# Patient Record
Sex: MALE | Race: WHITE | NOT HISPANIC OR LATINO | Employment: FULL TIME | URBAN - NONMETROPOLITAN AREA
[De-identification: names, ages, dates, MRNs, and addresses within clinical notes are randomized per-mention and may not be internally consistent; named-entity substitution may affect disease eponyms.]

---

## 2024-02-21 ENCOUNTER — OFFICE VISIT (OUTPATIENT)
Dept: PEDIATRICS | Facility: CLINIC | Age: 19
End: 2024-02-21
Payer: COMMERCIAL

## 2024-02-21 VITALS
TEMPERATURE: 98.6 F | HEART RATE: 87 BPM | HEIGHT: 72 IN | BODY MASS INDEX: 31.15 KG/M2 | WEIGHT: 230 LBS | OXYGEN SATURATION: 98 %

## 2024-02-21 DIAGNOSIS — J06.9 VIRAL UPPER RESPIRATORY TRACT INFECTION: ICD-10-CM

## 2024-02-21 DIAGNOSIS — J45.21 MILD INTERMITTENT ASTHMA WITH EXACERBATION (HHS-HCC): Primary | ICD-10-CM

## 2024-02-21 PROBLEM — Z78.9 MEDICAL HOME PATIENT: Status: ACTIVE | Noted: 2024-02-21

## 2024-02-21 PROBLEM — S46.919A STRAIN OF SHOULDER: Status: ACTIVE | Noted: 2024-02-21

## 2024-02-21 PROBLEM — S46.011A STRAIN OF RIGHT ROTATOR CUFF CAPSULE: Status: ACTIVE | Noted: 2024-02-21

## 2024-02-21 PROBLEM — S43.51XA SPRAIN OF RIGHT ACROMIOCLAVICULAR JOINT: Status: ACTIVE | Noted: 2024-02-21

## 2024-02-21 PROBLEM — J45.40: Status: ACTIVE | Noted: 2017-09-07

## 2024-02-21 PROBLEM — K21.9 GASTROESOPHAGEAL REFLUX DISEASE WITHOUT ESOPHAGITIS: Status: ACTIVE | Noted: 2017-09-07

## 2024-02-21 PROBLEM — J45.901 EXACERBATION OF ASTHMA (HHS-HCC): Status: ACTIVE | Noted: 2024-02-21

## 2024-02-21 PROBLEM — M75.51 SUBACROMIAL BURSITIS OF RIGHT SHOULDER JOINT: Status: ACTIVE | Noted: 2024-02-21

## 2024-02-21 PROBLEM — M77.8 CAPSULITIS OF RIGHT SHOULDER: Status: ACTIVE | Noted: 2024-02-21

## 2024-02-21 PROCEDURE — 99214 OFFICE O/P EST MOD 30 MIN: CPT | Performed by: PEDIATRICS

## 2024-02-21 PROCEDURE — 1036F TOBACCO NON-USER: CPT | Performed by: PEDIATRICS

## 2024-02-21 PROCEDURE — 94760 N-INVAS EAR/PLS OXIMETRY 1: CPT | Performed by: PEDIATRICS

## 2024-02-21 RX ORDER — ALBUTEROL SULFATE 90 UG/1
1-2 AEROSOL, METERED RESPIRATORY (INHALATION)
COMMUNITY
Start: 2023-08-10 | End: 2024-03-18

## 2024-02-21 RX ORDER — PREDNISONE 20 MG/1
60 TABLET ORAL DAILY
Qty: 15 TABLET | Refills: 0 | Status: SHIPPED | OUTPATIENT
Start: 2024-02-21 | End: 2024-02-26

## 2024-02-21 RX ORDER — ALBUTEROL SULFATE 0.83 MG/ML
2.5 SOLUTION RESPIRATORY (INHALATION) EVERY 4 HOURS PRN
COMMUNITY
Start: 2019-05-23

## 2024-02-21 ASSESSMENT — PAIN SCALES - GENERAL: PAINLEVEL: 0-NO PAIN

## 2024-02-21 ASSESSMENT — PATIENT HEALTH QUESTIONNAIRE - PHQ9
1. LITTLE INTEREST OR PLEASURE IN DOING THINGS: NOT AT ALL
2. FEELING DOWN, DEPRESSED OR HOPELESS: NOT AT ALL
SUM OF ALL RESPONSES TO PHQ9 QUESTIONS 1 AND 2: 0

## 2024-02-21 ASSESSMENT — LIFESTYLE VARIABLES: HOW OFTEN DO YOU HAVE A DRINK CONTAINING ALCOHOL: NEVER

## 2024-02-21 NOTE — PROGRESS NOTES
"Subjective   History was provided by the patient.  Napoleon Zhang is a 19 y.o. male who presents for evaluation of symptoms of a URI. Symptoms include nasal blockage, post nasal drip, sinus and nasal congestion, and wheezing. Onset of symptoms was 1 week ago, unchanged since that time. Associated symptoms include nasal congestion. He is drinking plenty of fluids. Evaluation to date: seen previously and thought to have a viral URI. Treatment to date: none and albuterol    Allergies   Allergen Reactions    Cefdinir GI Upset, Hives and Nausea/vomiting    Penicillins Hives      Objective   Pulse 87   Temp 37 °C (98.6 °F) (Temporal)   Ht 1.835 m (6' 0.25\")   Wt 104 kg (230 lb)   SpO2 98%   BMI 30.98 kg/m²   General: alert, active, in no acute distress  Eyes: conjunctiva clear  Ears: tympanic membranes clear bilaterally  Nose: clear congestion  Throat: clear  Neck: supple, no lymphadenopathy  Lungs: end exp wheezing, no crackles or rhonchi, breathing unlabored  Heart: regular rate and rhythm, normal S1, S2, no murmurs or gallops.  Abdomen: Abdomen soft, non-tender.  BS normal. , no organomegaly  Skin: no rashes        Assessment/Plan     1. Mild intermittent asthma with exacerbation  Continue albuterol  2 puffs q 4   - predniSONE (Deltasone) 20 mg tablet; Take 3 tablets (60 mg) by mouth once daily for 5 days.  Dispense: 15 tablet; Refill: 0    2. Viral upper respiratory tract infection       Follow up as needed.  "

## 2024-05-15 ENCOUNTER — OFFICE VISIT (OUTPATIENT)
Dept: PRIMARY CARE | Facility: CLINIC | Age: 19
End: 2024-05-15
Payer: COMMERCIAL

## 2024-05-15 VITALS
BODY MASS INDEX: 30.27 KG/M2 | WEIGHT: 228.4 LBS | HEIGHT: 73 IN | OXYGEN SATURATION: 99 % | SYSTOLIC BLOOD PRESSURE: 128 MMHG | DIASTOLIC BLOOD PRESSURE: 84 MMHG | HEART RATE: 68 BPM

## 2024-05-15 DIAGNOSIS — J45.40 ASTHMA, WELL CONTROLLED, MODERATE PERSISTENT (HHS-HCC): Primary | ICD-10-CM

## 2024-05-15 PROBLEM — S46.919A STRAIN OF SHOULDER: Status: RESOLVED | Noted: 2024-02-21 | Resolved: 2024-05-15

## 2024-05-15 PROBLEM — M77.8 CAPSULITIS OF RIGHT SHOULDER: Status: RESOLVED | Noted: 2024-02-21 | Resolved: 2024-05-15

## 2024-05-15 PROBLEM — S43.51XA SPRAIN OF RIGHT ACROMIOCLAVICULAR JOINT: Status: RESOLVED | Noted: 2024-02-21 | Resolved: 2024-05-15

## 2024-05-15 PROBLEM — M75.51 SUBACROMIAL BURSITIS OF RIGHT SHOULDER JOINT: Status: RESOLVED | Noted: 2024-02-21 | Resolved: 2024-05-15

## 2024-05-15 PROBLEM — S46.011A STRAIN OF RIGHT ROTATOR CUFF CAPSULE: Status: RESOLVED | Noted: 2024-02-21 | Resolved: 2024-05-15

## 2024-05-15 PROBLEM — Z78.9 MEDICAL HOME PATIENT: Status: RESOLVED | Noted: 2024-02-21 | Resolved: 2024-05-15

## 2024-05-15 PROBLEM — J45.901 EXACERBATION OF ASTHMA (HHS-HCC): Status: RESOLVED | Noted: 2024-02-21 | Resolved: 2024-05-15

## 2024-05-15 PROBLEM — K21.9 GASTROESOPHAGEAL REFLUX DISEASE WITHOUT ESOPHAGITIS: Status: RESOLVED | Noted: 2017-09-07 | Resolved: 2024-05-15

## 2024-05-15 PROCEDURE — 99203 OFFICE O/P NEW LOW 30 MIN: CPT | Performed by: FAMILY MEDICINE

## 2024-05-15 PROCEDURE — 1036F TOBACCO NON-USER: CPT | Performed by: FAMILY MEDICINE

## 2024-05-15 PROCEDURE — 90715 TDAP VACCINE 7 YRS/> IM: CPT | Performed by: FAMILY MEDICINE

## 2024-05-15 ASSESSMENT — ENCOUNTER SYMPTOMS
DIARRHEA: 0
PALPITATIONS: 0
CONSTIPATION: 0
COUGH: 0
HEADACHES: 0
ACTIVITY CHANGE: 0
CHEST TIGHTNESS: 0
DIZZINESS: 0
SHORTNESS OF BREATH: 0

## 2024-05-15 ASSESSMENT — PATIENT HEALTH QUESTIONNAIRE - PHQ9
2. FEELING DOWN, DEPRESSED OR HOPELESS: NOT AT ALL
SUM OF ALL RESPONSES TO PHQ9 QUESTIONS 1 AND 2: 0
1. LITTLE INTEREST OR PLEASURE IN DOING THINGS: NOT AT ALL

## 2024-05-15 ASSESSMENT — PAIN SCALES - GENERAL: PAINLEVEL: 0-NO PAIN

## 2024-05-15 NOTE — PROGRESS NOTES
"Subjective   Patient ID: Napoleon Zhang is a 19 y.o. male who presents for Establish PCP.    HPI   He presents today to establish as a patient.  Overall doing well.  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    Does not use his albuterol inhaler much at all now.  He states his pulmonologist thinks that maybe he is going out of the asthma.    Review of Systems   Constitutional:  Negative for activity change.   HENT:  Negative for congestion.    Respiratory:  Negative for cough, chest tightness and shortness of breath.    Cardiovascular:  Negative for chest pain, palpitations and leg swelling.   Gastrointestinal:  Negative for constipation and diarrhea.   Neurological:  Negative for dizziness and headaches.       Objective   /84   Pulse 68   Ht 1.861 m (6' 1.25\")   Wt 104 kg (228 lb 6.4 oz)   SpO2 99%   BMI 29.93 kg/m²     Physical Exam  Vitals reviewed.   Constitutional:       Appearance: Normal appearance.   HENT:      Right Ear: Tympanic membrane, ear canal and external ear normal.      Left Ear: Tympanic membrane, ear canal and external ear normal.      Nose: Nose normal.      Mouth/Throat:      Mouth: Mucous membranes are moist.      Pharynx: Oropharynx is clear.   Eyes:      Conjunctiva/sclera: Conjunctivae normal.   Neck:      Thyroid: No thyromegaly or thyroid tenderness.      Vascular: No carotid bruit.   Cardiovascular:      Rate and Rhythm: Normal rate and regular rhythm.      Heart sounds: No murmur heard.  Pulmonary:      Effort: Pulmonary effort is normal.      Breath sounds: Normal breath sounds.   Abdominal:      General: Abdomen is flat.      Palpations: Abdomen is soft. There is no mass.      Tenderness: There is no abdominal tenderness.   Musculoskeletal:      Cervical back: Neck supple.      Right lower leg: No edema.      Left lower leg: No edema.   Lymphadenopathy:      Cervical: No " cervical adenopathy.   Skin:     General: Skin is warm and dry.   Neurological:      Mental Status: He is alert.   Psychiatric:         Mood and Affect: Mood normal.         Assessment/Plan   Diagnoses and all orders for this visit:  Asthma, well controlled, moderate persistent ([facility])  Overall, doing very well.  He will keep the inhaler around just in case he develops a cough.  We discussed that sometimes a cold or any respiratory infection can trigger the asthma so just use the inhaler at that time.  Follow-up with me as needed.

## 2024-10-02 ENCOUNTER — OFFICE VISIT (OUTPATIENT)
Dept: PRIMARY CARE | Facility: CLINIC | Age: 19
End: 2024-10-02
Payer: COMMERCIAL

## 2024-10-02 VITALS
OXYGEN SATURATION: 98 % | SYSTOLIC BLOOD PRESSURE: 132 MMHG | TEMPERATURE: 99.5 F | BODY MASS INDEX: 32.08 KG/M2 | HEART RATE: 102 BPM | DIASTOLIC BLOOD PRESSURE: 84 MMHG | WEIGHT: 244.8 LBS

## 2024-10-02 DIAGNOSIS — J06.9 VIRAL URI: Primary | ICD-10-CM

## 2024-10-02 PROCEDURE — 99213 OFFICE O/P EST LOW 20 MIN: CPT | Performed by: FAMILY MEDICINE

## 2024-10-02 PROCEDURE — 1036F TOBACCO NON-USER: CPT | Performed by: FAMILY MEDICINE

## 2024-10-02 ASSESSMENT — PATIENT HEALTH QUESTIONNAIRE - PHQ9
2. FEELING DOWN, DEPRESSED OR HOPELESS: NOT AT ALL
1. LITTLE INTEREST OR PLEASURE IN DOING THINGS: NOT AT ALL
SUM OF ALL RESPONSES TO PHQ9 QUESTIONS 1 AND 2: 0

## 2024-10-02 ASSESSMENT — PAIN SCALES - GENERAL: PAINLEVEL: 0-NO PAIN

## 2024-10-02 NOTE — LETTER
October 2, 2024     Patient: Napoleon Zhang   YOB: 2005   Date of Visit: 10/2/2024       To Whom It May Concern:    Napoleon Zhang was seen in my clinic on 10/2/2024 at 12:45 pm. Please excuse Napoleon for his absence from work this week. He will return to work on Friday if feeling back to normal.    If you have any questions or concerns, please don't hesitate to call.         Sincerely,         Roger Aguilera MD        CC: No Recipients

## 2024-10-02 NOTE — LETTER
October 4, 2024     Patient: Napoleon Zhang   YOB: 2005   Date of Visit: 10/2/2024       To Whom It May Concern:    Napoleon Zhang was seen in my clinic on 10/2/2024 at 12:45 pm. Please excuse Napoleon for his absence from work this week. He will return to work on Monday 10/7/24,  if feeling back to normal.     If you have any questions or concerns, please don't hesitate to call.    Sincerely,         Roger Aguilera MD

## 2024-10-02 NOTE — PROGRESS NOTES
Subjective   Patient ID: Napoleon Zhang is a 19 y.o. male who presents for Fever (X 3 days), Cough (X 1 week), Nasal Congestion (Today), and Headache (today).    HPI   He presents today feeling ill since Sunday, 3 days ago.  He has had a cough for about a week but then a fever over these last 3 days and nasal congestion beginning more today.  He states he is very sensitive in his skin.  Feels fatigued.  Has been sleeping quite a bit.  He is missed work these last week because of the symptoms.  His dad was sick with similar symptoms last week.    States he had a sensitivity to a vanilla perfume.    Review of Systems    Objective   /84   Pulse 102   Temp 37.5 °C (99.5 °F) (Oral)   Wt 111 kg (244 lb 12.8 oz)   SpO2 98%   BMI 32.08 kg/m²     Physical Exam  Vitals reviewed.   Constitutional:       Appearance: He is not toxic-appearing.   HENT:      Right Ear: Tympanic membrane and ear canal normal.      Left Ear: Tympanic membrane and ear canal normal.      Nose: Congestion and rhinorrhea present.      Mouth/Throat:      Mouth: Mucous membranes are moist.      Pharynx: Oropharynx is clear.   Eyes:      Conjunctiva/sclera: Conjunctivae normal.   Pulmonary:      Effort: Pulmonary effort is normal. No respiratory distress.      Breath sounds: No wheezing or rhonchi.   Musculoskeletal:      Cervical back: No rigidity.   Lymphadenopathy:      Cervical: No cervical adenopathy.   Neurological:      Mental Status: He is alert.   Psychiatric:         Mood and Affect: Mood normal.         Assessment/Plan   Diagnoses and all orders for this visit:  Viral URI  At this point, he will rest at home, drink plenty fluids, keep the stomach warm and let me know how he does over the next 2 or 3 days.  Be getting any worse or not really recovered much by Friday he will let me know.  Little unclear exactly how long he has been sick.  Certainly quite sick for the last 3 days but may have had some other symptoms such as cough prior  to that.,  So might consider an antibiotic if he is not really improving much over the next couple days.

## 2024-10-04 ENCOUNTER — TELEPHONE (OUTPATIENT)
Dept: PRIMARY CARE | Facility: CLINIC | Age: 19
End: 2024-10-04

## 2024-10-04 ENCOUNTER — APPOINTMENT (OUTPATIENT)
Dept: RADIOLOGY | Facility: HOSPITAL | Age: 19
End: 2024-10-04
Payer: COMMERCIAL

## 2024-10-04 ENCOUNTER — HOSPITAL ENCOUNTER (EMERGENCY)
Facility: HOSPITAL | Age: 19
Discharge: HOME | End: 2024-10-04
Payer: COMMERCIAL

## 2024-10-04 VITALS
HEIGHT: 74 IN | OXYGEN SATURATION: 100 % | RESPIRATION RATE: 18 BRPM | SYSTOLIC BLOOD PRESSURE: 153 MMHG | BODY MASS INDEX: 30.95 KG/M2 | WEIGHT: 241.18 LBS | TEMPERATURE: 98.4 F | HEART RATE: 98 BPM | DIASTOLIC BLOOD PRESSURE: 93 MMHG

## 2024-10-04 DIAGNOSIS — B34.9 ACUTE VIRAL SYNDROME: Primary | ICD-10-CM

## 2024-10-04 LAB
FLUAV RNA RESP QL NAA+PROBE: NOT DETECTED
FLUBV RNA RESP QL NAA+PROBE: NOT DETECTED
S PYO DNA THROAT QL NAA+PROBE: NOT DETECTED
SARS-COV-2 RNA RESP QL NAA+PROBE: NOT DETECTED

## 2024-10-04 PROCEDURE — 99283 EMERGENCY DEPT VISIT LOW MDM: CPT

## 2024-10-04 PROCEDURE — 87651 STREP A DNA AMP PROBE: CPT

## 2024-10-04 PROCEDURE — 71045 X-RAY EXAM CHEST 1 VIEW: CPT | Performed by: RADIOLOGY

## 2024-10-04 PROCEDURE — 71046 X-RAY EXAM CHEST 2 VIEWS: CPT

## 2024-10-04 PROCEDURE — 87635 SARS-COV-2 COVID-19 AMP PRB: CPT

## 2024-10-04 PROCEDURE — 87636 SARSCOV2 & INF A&B AMP PRB: CPT

## 2024-10-04 ASSESSMENT — COLUMBIA-SUICIDE SEVERITY RATING SCALE - C-SSRS
2. HAVE YOU ACTUALLY HAD ANY THOUGHTS OF KILLING YOURSELF?: NO
6. HAVE YOU EVER DONE ANYTHING, STARTED TO DO ANYTHING, OR PREPARED TO DO ANYTHING TO END YOUR LIFE?: NO
1. IN THE PAST MONTH, HAVE YOU WISHED YOU WERE DEAD OR WISHED YOU COULD GO TO SLEEP AND NOT WAKE UP?: NO

## 2024-10-04 ASSESSMENT — PAIN - FUNCTIONAL ASSESSMENT: PAIN_FUNCTIONAL_ASSESSMENT: 0-10

## 2024-10-04 ASSESSMENT — PAIN SCALES - GENERAL: PAINLEVEL_OUTOF10: 1

## 2024-10-04 ASSESSMENT — PAIN DESCRIPTION - LOCATION: LOCATION: GENERALIZED

## 2024-10-04 NOTE — ED PROVIDER NOTES
HPI   Chief Complaint   Patient presents with    Fever     Fever, chills, body aches, cough for 4-5 days. Nausea vomiting for two days without diarrhea. Took ibuprofen at 1330       Patient is a 19-year-old male presents emergency department accompanied by mother for evaluation of fevers, chills, body aches, and cough for 4 to 5 days.  Patient states that multiple individuals in his house have been sick with similar symptoms.  He states he started with a somewhat sore throat and has turned into nasal congestion, cough and congestion.  He states he has widespread body aches.  He has been having fevers at home as well.  He did have 1 episode of vomiting and some mild nausea.  He denies any associate abdominal pain.  He denies any difficulty breathing, chest pain, lightheadedness, dizziness, or other concerns at this time.  He denies any difficulty swallowing.  He has been urinating and defecating without difficulty.      History provided by:  Patient   used: No            Patient History   No past medical history on file.  No past surgical history on file.  No family history on file.  Social History     Tobacco Use    Smoking status: Never    Smokeless tobacco: Never   Vaping Use    Vaping status: Never Used   Substance Use Topics    Alcohol use: Never    Drug use: Never       Physical Exam   ED Triage Vitals [10/04/24 1525]   Temperature Heart Rate Respirations BP   36.9 °C (98.4 °F) 98 18 (!) 153/93      Pulse Ox Temp src Heart Rate Source Patient Position   100 % -- Monitor --      BP Location FiO2 (%)     -- --       Physical Exam  Constitutional:       Appearance: Normal appearance.   HENT:      Mouth/Throat:      Mouth: Mucous membranes are moist.      Pharynx: No oropharyngeal exudate or posterior oropharyngeal erythema.   Eyes:      Extraocular Movements: Extraocular movements intact.      Pupils: Pupils are equal, round, and reactive to light.   Cardiovascular:      Rate and Rhythm: Normal  rate and regular rhythm.   Pulmonary:      Effort: Pulmonary effort is normal.      Breath sounds: Normal breath sounds.   Musculoskeletal:         General: Normal range of motion.   Skin:     General: Skin is warm and dry.   Neurological:      General: No focal deficit present.      Mental Status: He is alert and oriented to person, place, and time.           ED Course & MDM   Diagnoses as of 10/04/24 6787   Acute viral syndrome                 No data recorded     Dolph Coma Scale Score: 15 (10/04/24 1523 : Ashley Smith RN)                           Medical Decision Making  Patient is a 19-year-old male presents emergency department for evaluation of leg symptoms over the last 4 to 5 days.    Lab work done today included strep test and flu/COVID swabs.  Strep test negative and flu/COVID swabs negative.      Scans done today were interpreted/confirmed by radiologist and also interpreted by me which included chest x-ray.  Chest x-ray shows no acute focal consolidation.    Medications Not given at today's visit    I saw this patient independently.  Patient remained stable in the emergency department and well-appearing.  He is afebrile and nontoxic-appearing.  He is oxygenating well on room air.  Chest x-ray shows no evidence for acute cardiopulmonary process.  Patient has clinical symptoms, history, and physical exam consistent with viral syndrome.  Given length of symptoms and relatively benign exam with stable vital signs, patient is able to be discharged home for supportive care outpatient.  There appears to be no evidence of any secondary bacterial infections including otitis media, pneumonia, exudative pharyngitis, CNS infections, or concern for widespread systemic infection such as sepsis.  Patient was educated about measures to take for symptom management including ibuprofen, Tylenol, and over-the-counter cold medications to help with symptoms.  Patient was advised to increase fluids to prevent any  dehydration.  Emergent pathologies were considered for this patient, although I have low suspicion for anything acutely emergent given patient's clinical presentation, history, physical exam, stable vital signs, and relatively unremarkable workup.  Discharging patient home is reasonable plan of care for outpatient management.    All labs, imaging, and diagnostic studies were reviewed by me and patient was counseled on clinical impression, expectations, and plan.  Patient was educated to follow-up with PCP in the following 1-2 days.  All questions from patient were answered. They elicited understanding and were agreeable to course of treatment.  Patient was discharged in stable condition and given strict return precautions.    ** Disclaimer:  Parts of this document were written utilizing a voice to text dictation software.  Note may contain minor transcription or typographical errors that were inadvertently transcribed by the computer software.        Procedure  Procedures     Ashley Quan PA-C  10/04/24 9803

## 2024-10-04 NOTE — Clinical Note
Napoleon Zhang was seen and treated in our emergency department on 10/4/2024.  He may return to work on 10/06/2024.       If you have any questions or concerns, please don't hesitate to call.      Ashley Quan PA-C

## 2024-10-04 NOTE — DISCHARGE INSTRUCTIONS
Follow close with your primary care provider in the following week.  Continue over-the-counter cold medications and continue Tylenol and ibuprofen.  Increase fluids to prevent any dehydration.

## 2025-01-13 ENCOUNTER — OFFICE VISIT (OUTPATIENT)
Dept: URGENT CARE | Age: 20
End: 2025-01-13

## 2025-01-13 VITALS
TEMPERATURE: 98.2 F | HEIGHT: 73 IN | WEIGHT: 240.08 LBS | BODY MASS INDEX: 31.82 KG/M2 | RESPIRATION RATE: 16 BRPM | OXYGEN SATURATION: 99 % | DIASTOLIC BLOOD PRESSURE: 75 MMHG | HEART RATE: 68 BPM | SYSTOLIC BLOOD PRESSURE: 128 MMHG

## 2025-01-13 DIAGNOSIS — Z00.00 PHYSICAL EXAM: Primary | ICD-10-CM

## 2025-01-13 PROCEDURE — 3008F BODY MASS INDEX DOCD: CPT

## 2025-01-13 PROCEDURE — 1036F TOBACCO NON-USER: CPT

## 2025-01-13 PROCEDURE — INPHY INTER PHYSICAL

## 2025-01-13 ASSESSMENT — VISUAL ACUITY
OD_CC: 20/15
OS_CC: 20/25

## 2025-01-13 NOTE — PROGRESS NOTES
"Subjective   Patient ID: Napoleon Zhang is a 19 y.o. male. They present today with a chief complaint of Physical.    History of Present Illness  See mdm       History provided by:  Patient   used: No        Past Medical History  Allergies as of 01/13/2025 - Reviewed 01/13/2025   Allergen Reaction Noted    Cefdinir GI Upset, Hives, and Nausea/vomiting 04/24/2019    Penicillins Hives 04/23/2010       (Not in a hospital admission)       No past medical history on file.    No past surgical history on file.     reports that he has never smoked. He has never used smokeless tobacco. He reports that he does not drink alcohol and does not use drugs.    Review of Systems  Review of Systems   All other systems reviewed and are negative.                                 Objective    Vitals:    01/13/25 1824   BP: 128/75   Pulse: 68   Resp: 16   Temp: 36.8 °C (98.2 °F)   SpO2: 99%   Weight: 109 kg (240 lb 1.3 oz)   Height: 1.854 m (6' 1\")     No LMP for male patient.    Physical Exam  Vitals and nursing note reviewed.   Constitutional:       General: He is not in acute distress.     Appearance: He is normal weight. He is not ill-appearing, toxic-appearing or diaphoretic.   HENT:      Head: Normocephalic and atraumatic.      Nose: Nose normal.      Mouth/Throat:      Mouth: Mucous membranes are moist.      Pharynx: No oropharyngeal exudate or posterior oropharyngeal erythema.   Eyes:      General: No scleral icterus.        Right eye: No discharge.         Left eye: No discharge.      Extraocular Movements: Extraocular movements intact.      Conjunctiva/sclera: Conjunctivae normal.      Pupils: Pupils are equal, round, and reactive to light.   Cardiovascular:      Rate and Rhythm: Normal rate and regular rhythm.      Pulses: Normal pulses.      Heart sounds: No murmur heard.     No friction rub. No gallop.   Pulmonary:      Effort: Pulmonary effort is normal. No respiratory distress.      Breath sounds: No " stridor. No wheezing, rhonchi or rales.   Abdominal:      General: Abdomen is flat.   Musculoskeletal:      Cervical back: Normal range of motion and neck supple. No rigidity or tenderness.   Skin:     General: Skin is warm and dry.      Capillary Refill: Capillary refill takes less than 2 seconds.   Neurological:      General: No focal deficit present.      Mental Status: He is alert.      Sensory: No sensory deficit.      Motor: No weakness.      Coordination: Coordination normal.      Gait: Gait normal.   Psychiatric:         Mood and Affect: Mood normal.         Behavior: Behavior normal.         Procedures    Point of Care Test & Imaging Results from this visit  No results found for this visit on 01/13/25.   No results found.    Diagnostic study results (if any) were reviewed by Maria E Betancourt PA-C.    Assessment/Plan   Allergies, medications, history, and pertinent labs/EKGs/Imaging reviewed by Maria E Betancourt PA-C.     Medical Decision Making  20 yo M with Pmhx of asthma presents for physical for [employer].  History of contact lenses and asthma.  Childhood hospitalization for asthma, none severe since.  No asthma exacerbations in years.  Has PRN albuterol inhaler no other medications.  Exam benign.  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  See scanned document.      Orders and Diagnoses  Diagnoses and all orders for this visit:  Physical exam      Medical Admin Record      Patient disposition: Home    Electronically signed by Maria E Betancourt PA-C  6:52 PM

## 2025-02-13 ENCOUNTER — OFFICE VISIT (OUTPATIENT)
Dept: URGENT CARE | Age: 20
End: 2025-02-13
Payer: COMMERCIAL

## 2025-02-13 ENCOUNTER — ANCILLARY PROCEDURE (OUTPATIENT)
Dept: URGENT CARE | Age: 20
End: 2025-02-13
Payer: COMMERCIAL

## 2025-02-13 VITALS
WEIGHT: 225 LBS | BODY MASS INDEX: 29.82 KG/M2 | DIASTOLIC BLOOD PRESSURE: 69 MMHG | OXYGEN SATURATION: 100 % | HEIGHT: 73 IN | SYSTOLIC BLOOD PRESSURE: 115 MMHG | HEART RATE: 58 BPM | RESPIRATION RATE: 16 BRPM | TEMPERATURE: 97.8 F

## 2025-02-13 DIAGNOSIS — S63.617A SPRAIN OF LEFT LITTLE FINGER, UNSPECIFIED SITE OF DIGIT, INITIAL ENCOUNTER: Primary | ICD-10-CM

## 2025-02-13 DIAGNOSIS — M79.645 PAIN OF FINGER OF LEFT HAND: ICD-10-CM

## 2025-02-13 PROCEDURE — 73140 X-RAY EXAM OF FINGER(S): CPT | Mod: LEFT SIDE

## 2025-02-13 ASSESSMENT — PAIN SCALES - GENERAL: PAINLEVEL_OUTOF10: 2

## 2025-02-14 NOTE — PATIENT INSTRUCTIONS
Follow-up finger sprain discharge instructions.  RICE, rotate Tylenol/ibuprofen if needed for pain.  Call referral number to schedule follow-up with orthopedics.

## 2025-02-14 NOTE — PROGRESS NOTES
"Subjective   Patient ID: Napoleon Zhang is a 20 y.o. male. They present today with a chief complaint of Injury (Hit left 5th digit on door frame x 1 hour ago).    History of Present Illness  20-year-old male presents urgent care accompanied by father for complaint of left little finger injury occurred today 1 hour prior to urgent care arrival when he hit his finger on the door and states he bent to the distal end and has slight deformity.  States pain 1/10 intensity.  Neurovascular intact.  Denies any other injury or concern at this time.  Radiologist findings of the finger x-ray stated \"There is extremely subtle degree of dorsal subluxation of the distal  phalanx relative to the head of the middle phalanx that may be  related to traumatic injury to the volar joint capsule. No acute  fractures. MRI would better evaluate for soft tissue injuries on a  nonemergent basis.\".  Placed in splint.  Placed orthopedic referral for them to follow-up with today to schedule appointment.  Educate on supportive care.  Return/ER precautions.  Patient and patient's father agree with plan.      Injury      Past Medical History  Allergies as of 02/13/2025 - Reviewed 02/13/2025   Allergen Reaction Noted    Cefdinir GI Upset, Hives, and Nausea/vomiting 04/24/2019    Penicillins Hives 04/23/2010       (Not in a hospital admission)       History reviewed. No pertinent past medical history.    History reviewed. No pertinent surgical history.     reports that he has never smoked. He has never used smokeless tobacco. He reports that he does not drink alcohol and does not use drugs.    Review of Systems  Review of Systems   All other systems reviewed and are negative.                                 Objective    Vitals:    02/13/25 1923   BP: 115/69   BP Location: Left arm   Patient Position: Sitting   BP Cuff Size: Large adult   Pulse: 58   Resp: 16   Temp: 36.6 °C (97.8 °F)   TempSrc: Oral   SpO2: 100%   Weight: 102 kg (225 lb)   Height: " "1.854 m (6' 1\")     No LMP for male patient.    Physical Exam  Vitals reviewed.   Constitutional:       General: He is not in acute distress.     Appearance: Normal appearance. He is not ill-appearing, toxic-appearing or diaphoretic.   HENT:      Head: Normocephalic and atraumatic.      Nose: Nose normal.   Cardiovascular:      Rate and Rhythm: Normal rate and regular rhythm.   Pulmonary:      Effort: Pulmonary effort is normal. No respiratory distress.   Musculoskeletal:      Cervical back: Normal range of motion and neck supple.      Comments: Slight angulation distal phalanx of the fifth digit left hand.  No overlying skin changes.  Capillary refill less than 2 seconds.  Bilateral radial pulses intact and symmetrical.  Bilateral median, ulnar, radial nerves intact motor and sensory.  Has full range of motion all fingers.  Flexor and extensor tendons intact, of the fifth digit left hand.   Skin:     General: Skin is warm and dry.   Neurological:      General: No focal deficit present.      Mental Status: He is alert and oriented to person, place, and time.   Psychiatric:         Mood and Affect: Mood normal.         Behavior: Behavior normal.         Procedures    Point of Care Test & Imaging Results from this visit  No results found for this visit on 02/13/25.   No results found.    Diagnostic study results (if any) were reviewed by Ramírez Hameed PA-C.    Assessment/Plan   Allergies, medications, history, and pertinent labs/EKGs/Imaging reviewed by Ramírez Hameed PA-C.     Medical Decision Making  20-year-old male presents urgent care accompanied by father for complaint of left little finger injury occurred today 1 hour prior to urgent care arrival when he hit his finger on the door and states he bent to the distal end and has slight deformity.  States pain 1/10 intensity.  Neurovascular intact.  Denies any other injury or concern at this time.  Radiologist findings of the finger x-ray stated \"There is " "extremely subtle degree of dorsal subluxation of the distal  phalanx relative to the head of the middle phalanx that may be  related to traumatic injury to the volar joint capsule. No acute  fractures. MRI would better evaluate for soft tissue injuries on a  nonemergent basis.\".  Placed in splint.  Placed orthopedic referral for them to follow-up with today to schedule appointment.  Educate on supportive care.  Return/ER precautions.  Patient and patient's father agree with plan.    Orders and Diagnoses  Diagnoses and all orders for this visit:  Pain of finger of left hand  -     XR fingers left 2+ views; Future      Medical Admin Record      Patient disposition: Home    Electronically signed by Ramírez Hameed PA-C  7:56 PM      "

## 2025-02-17 ENCOUNTER — HOSPITAL ENCOUNTER (OUTPATIENT)
Dept: CARDIOLOGY | Facility: HOSPITAL | Age: 20
Discharge: HOME | End: 2025-02-17

## 2025-02-17 ENCOUNTER — HOSPITAL ENCOUNTER (OUTPATIENT)
Dept: RADIOLOGY | Facility: HOSPITAL | Age: 20
Discharge: HOME | End: 2025-02-17

## 2025-02-17 ENCOUNTER — APPOINTMENT (OUTPATIENT)
Dept: RADIOLOGY | Facility: HOSPITAL | Age: 20
End: 2025-02-17

## 2025-02-17 DIAGNOSIS — Z02.1 ENCOUNTER FOR PRE-EMPLOYMENT EXAMINATION: ICD-10-CM

## 2025-02-17 LAB
ATRIAL RATE: 64 BPM
P AXIS: 69 DEGREES
P OFFSET: 198 MS
P ONSET: 148 MS
PR INTERVAL: 148 MS
Q ONSET: 222 MS
QRS COUNT: 11 BEATS
QRS DURATION: 94 MS
QT INTERVAL: 392 MS
QTC CALCULATION(BAZETT): 404 MS
QTC FREDERICIA: 400 MS
R AXIS: 73 DEGREES
T AXIS: 62 DEGREES
T OFFSET: 418 MS
VENTRICULAR RATE: 64 BPM

## 2025-02-17 PROCEDURE — 93005 ELECTROCARDIOGRAM TRACING: CPT

## 2025-02-17 PROCEDURE — 93017 CV STRESS TEST TRACING ONLY: CPT

## 2025-02-17 PROCEDURE — 71046 X-RAY EXAM CHEST 2 VIEWS: CPT | Performed by: STUDENT IN AN ORGANIZED HEALTH CARE EDUCATION/TRAINING PROGRAM

## 2025-02-17 PROCEDURE — 71046 X-RAY EXAM CHEST 2 VIEWS: CPT

## 2025-02-17 PROCEDURE — 93016 CV STRESS TEST SUPVJ ONLY: CPT | Performed by: INTERNAL MEDICINE

## 2025-02-17 PROCEDURE — 93018 CV STRESS TEST I&R ONLY: CPT | Performed by: INTERNAL MEDICINE

## 2025-02-25 ENCOUNTER — APPOINTMENT (OUTPATIENT)
Dept: PRIMARY CARE | Facility: CLINIC | Age: 20
End: 2025-02-25
Payer: COMMERCIAL

## 2025-02-26 ENCOUNTER — ANCILLARY PROCEDURE (OUTPATIENT)
Dept: URGENT CARE | Age: 20
End: 2025-02-26
Payer: COMMERCIAL

## 2025-02-26 ENCOUNTER — OFFICE VISIT (OUTPATIENT)
Dept: URGENT CARE | Age: 20
End: 2025-02-26
Payer: COMMERCIAL

## 2025-02-26 VITALS
SYSTOLIC BLOOD PRESSURE: 130 MMHG | HEART RATE: 62 BPM | HEIGHT: 73 IN | DIASTOLIC BLOOD PRESSURE: 78 MMHG | OXYGEN SATURATION: 97 % | TEMPERATURE: 98 F | WEIGHT: 225 LBS | RESPIRATION RATE: 16 BRPM | BODY MASS INDEX: 29.82 KG/M2

## 2025-02-26 DIAGNOSIS — Z20.822 SUSPECTED COVID-19 VIRUS INFECTION: ICD-10-CM

## 2025-02-26 DIAGNOSIS — R05.9 COUGH, UNSPECIFIED TYPE: ICD-10-CM

## 2025-02-26 DIAGNOSIS — J45.21 MILD INTERMITTENT ASTHMA WITH ACUTE EXACERBATION (HHS-HCC): Primary | ICD-10-CM

## 2025-02-26 LAB
POC RAPID INFLUENZA A: NEGATIVE
POC RAPID INFLUENZA B: NEGATIVE
POC SARS-COV-2 AG BINAX: NORMAL

## 2025-02-26 PROCEDURE — 87804 INFLUENZA ASSAY W/OPTIC: CPT

## 2025-02-26 PROCEDURE — 87811 SARS-COV-2 COVID19 W/OPTIC: CPT

## 2025-02-26 PROCEDURE — 3008F BODY MASS INDEX DOCD: CPT

## 2025-02-26 PROCEDURE — 99214 OFFICE O/P EST MOD 30 MIN: CPT

## 2025-02-26 PROCEDURE — 94640 AIRWAY INHALATION TREATMENT: CPT

## 2025-02-26 PROCEDURE — 71046 X-RAY EXAM CHEST 2 VIEWS: CPT

## 2025-02-26 RX ORDER — IPRATROPIUM BROMIDE AND ALBUTEROL SULFATE 2.5; .5 MG/3ML; MG/3ML
3 SOLUTION RESPIRATORY (INHALATION) ONCE
Status: COMPLETED | OUTPATIENT
Start: 2025-02-26 | End: 2025-02-26

## 2025-02-26 RX ORDER — PREDNISONE 20 MG/1
40 TABLET ORAL DAILY
Qty: 8 TABLET | Refills: 0 | Status: SHIPPED | OUTPATIENT
Start: 2025-02-27 | End: 2025-03-03

## 2025-02-26 RX ORDER — PREDNISONE 20 MG/1
40 TABLET ORAL ONCE
Status: COMPLETED | OUTPATIENT
Start: 2025-02-26 | End: 2025-02-26

## 2025-02-26 RX ADMIN — PREDNISONE 40 MG: 20 TABLET ORAL at 19:28

## 2025-02-26 RX ADMIN — IPRATROPIUM BROMIDE AND ALBUTEROL SULFATE 3 ML: 2.5; .5 SOLUTION RESPIRATORY (INHALATION) at 19:27

## 2025-02-26 ASSESSMENT — ENCOUNTER SYMPTOMS
SORE THROAT: 0
DIARRHEA: 0
EYE PAIN: 0
CHEST TIGHTNESS: 0
CHILLS: 1
SEIZURES: 0
COUGH: 1
FATIGUE: 0
ABDOMINAL PAIN: 0
MYALGIAS: 1
WHEEZING: 1
FLANK PAIN: 0
EYE ITCHING: 0
FEVER: 0
STRIDOR: 0
SINUS PRESSURE: 0
EYE DISCHARGE: 0
DIZZINESS: 0
VOMITING: 0
SHORTNESS OF BREATH: 0

## 2025-02-26 NOTE — PROGRESS NOTES
Subjective   Patient ID: Napoleon Zhang is a 20 y.o. male. They present today with a chief complaint of Other (Wheezing. Symptoms since Friday. Hx of asthma) and Cough.    History of Present Illness  Patient is a 20-year-old male presenting to the clinic with complaints of wheezing.  Patient states Friday around 5 days ago he had bodyaches, chills, cough, nasal congestion and intermittent ear pain.  Patient states for the last three days those symptoms subsided.  Patient states although most symptoms went away he still has wheezing.  Patient states cough is gone.  Patient is not short of breath he has no chest pain.  Patient states he has prior history of asthma.  Patient states he cannot get rid of the wheezing.  He has both inspiratory and expiratory wheezing actively in the clinic.      History provided by:  Patient  Cough  Associated symptoms include chills, ear pain, myalgias, postnasal drip and wheezing. Pertinent negatives include no chest pain, fever, sore throat or shortness of breath.       Past Medical History  Allergies as of 02/26/2025 - Reviewed 02/26/2025   Allergen Reaction Noted    Cefdinir GI Upset, Hives, and Nausea/vomiting 04/24/2019    Penicillins Hives 04/23/2010       (Not in a hospital admission)       History reviewed. No pertinent past medical history.    History reviewed. No pertinent surgical history.     reports that he has never smoked. He has never used smokeless tobacco. He reports that he does not drink alcohol and does not use drugs.    Review of Systems  Review of Systems   Constitutional:  Positive for chills. Negative for fatigue and fever.   HENT:  Positive for congestion, ear pain and postnasal drip. Negative for ear discharge, sinus pressure and sore throat.    Eyes:  Negative for pain, discharge and itching.   Respiratory:  Positive for cough and wheezing. Negative for chest tightness, shortness of breath and stridor.    Cardiovascular:  Negative for chest pain.  "  Gastrointestinal:  Negative for abdominal pain, diarrhea and vomiting.   Genitourinary:  Negative for flank pain.   Musculoskeletal:  Positive for myalgias.   Neurological:  Negative for dizziness and seizures.                                  Objective    Vitals:    02/26/25 1848   BP: 130/78   BP Location: Left arm   Patient Position: Sitting   BP Cuff Size: Large adult   Pulse: 62   Resp: 16   Temp: 36.7 °C (98 °F)   TempSrc: Oral   SpO2: 97%   Weight: 102 kg (225 lb)   Height: 1.854 m (6' 1\")     No LMP for male patient.    Physical Exam  Vitals reviewed.   Constitutional:       General: He is awake. He is not in acute distress.     Appearance: Normal appearance. He is well-developed. He is not ill-appearing or toxic-appearing.   HENT:      Head: Normocephalic and atraumatic.      Right Ear: Tympanic membrane, ear canal and external ear normal.      Left Ear: Tympanic membrane, ear canal and external ear normal.      Mouth/Throat:      Mouth: Mucous membranes are moist.      Pharynx: Oropharynx is clear. Uvula midline. No oropharyngeal exudate or posterior oropharyngeal erythema.      Tonsils: No tonsillar exudate or tonsillar abscesses.   Cardiovascular:      Rate and Rhythm: Normal rate and regular rhythm.      Heart sounds: Normal heart sounds, S1 normal and S2 normal. No murmur heard.     No friction rub. No gallop.   Pulmonary:      Effort: Pulmonary effort is normal. No respiratory distress.      Breath sounds: No stridor. Wheezing present. No rhonchi or rales.   Skin:     General: Skin is warm.   Neurological:      General: No focal deficit present.      Mental Status: He is alert and oriented to person, place, and time. Mental status is at baseline.      Gait: Gait is intact.   Psychiatric:         Mood and Affect: Mood normal.         Behavior: Behavior normal. Behavior is cooperative.         Procedures    Point of Care Test & Imaging Results from this visit  Results for orders placed or performed in " visit on 02/26/25   POCT Covid-19 Rapid Antigen   Result Value Ref Range    POC KELSEY-COV-2 AG  Presumptive negative test for SARS-CoV-2 (no antigen detected)     Presumptive negative test for SARS-CoV-2 (no antigen detected)   POCT Influenza A/B manually resulted   Result Value Ref Range    POC Rapid Influenza A Negative Negative    POC Rapid Influenza B Negative Negative      XR chest 2 views    Result Date: 2/26/2025  Interpreted By:  Jadiel Cabello, STUDY: XR CHEST 2 VIEWS;  2/26/2025 7:01 pm   INDICATION: Signs/Symptoms:cough.   ,R05.9 Cough, unspecified   COMPARISON: Chest radiograph 02/17/2025   ACCESSION NUMBER(S): [id number]   ORDERING CLINICIAN: JONATHAN CHILD   FINDINGS: SUPPORT DEVICES: None.   CARDIOMEDIASTINAL SILHOUETTE: Cardiomediastinal silhouette is normal in size and configuration.   LUNGS: No pulmonary consolidation, pleural effusion or pneumothorax.   ABDOMEN: No remarkable upper abdominal findings.   BONES: No acute osseous abnormality.       1. No acute cardiopulmonary abnormality.     Signed by: Jadiel Cabello 2/26/2025 7:05 PM Dictation workstation:   [id number]     Diagnostic study results (if any) were reviewed by [facility].    Assessment/Plan   Allergies, medications, history, and pertinent labs/EKGs/Imaging reviewed by Jonathan Child PA-C.     Medical Decision Making  Patient is a 20-year-old male presenting to the clinic with complaints of wheezing.  Patient states Friday around 5 days ago he had bodyaches, chills, cough, nasal congestion and intermittent ear pain.  Patient states for the last three days those symptoms subsided.  Patient states although most symptoms went away he still has wheezing.  Patient states cough is gone.  Patient is not short of breath he has no chest pain.  Patient states he has prior history of asthma.  Patient states he cannot get rid of the wheezing.  He has both inspiratory and expiratory wheezing actively in the clinic. Vital signs in the  clinic are stable.  Physical examination as above patient does have inspiratory and expiratory wheezing.  COVID and flu test are negative.  Chest x-ray read by radiology as no acute cardiopulmonary abnormality.  Attending physician consulted on patient case.  Attending physician okay with one-time dose 40 mg prednisone in the clinic with a DuoNeb therapy with a continued burst pack for 4 more days.  Strict discharge precautions to follow. Discharge instructions: Please follow up with your Primary Care Physician within the next 5-7 days. If you develop any chest pain, shortness of breath, difficulty breathing please immediately go to the emergency room for evaluation. It is important to take prescriptions as prescribed and complete all antibiotics. If your symptoms worsen you are instructed to immediately go to the emergency room for reevaluation and further assessment. If you develop any chest pain, SOB, or difficulty breathing you are instructed to go to the emergency room for reevaluation. All discharge instructions will be provided and explained to the patient at discharge. If you have any questions regarding your treatment plan please call the [facility] urgent care clinic.     Orders and Diagnoses  Diagnoses and all orders for this visit:  Mild intermittent asthma with acute exacerbation ([facility])  Suspected COVID-19 virus infection  -     POCT Covid-19 Rapid Antigen  Cough, unspecified type  -     POCT Influenza A/B manually resulted  -     XR chest 2 views; Future      Medical Admin Record      Patient disposition: Home    Electronically signed by [facility]  7:19 PM

## 2025-02-27 NOTE — PATIENT INSTRUCTIONS
Discharge instructions:    Please follow up with your Primary Care Physician within the next 5-7 days.    If you develop any chest pain, shortness of breath, difficulty breathing please immediately go to the emergency room for evaluation.    It is important to take prescriptions as prescribed and complete all antibiotics.     If your symptoms worsen you are instructed to immediately go to the emergency room for reevaluation and further assessment.    If you develop any chest pain, SOB, or difficulty breathing you are instructed to go to the emergency room for reevaluation.    All discharge instructions will be provided and explained to the patient at discharge.    If you have any questions regarding your treatment plan please call the [facility] urgent care clinic.

## 2025-04-05 ENCOUNTER — OFFICE VISIT (OUTPATIENT)
Dept: URGENT CARE | Age: 20
End: 2025-04-05
Payer: COMMERCIAL

## 2025-04-05 ENCOUNTER — ANCILLARY PROCEDURE (OUTPATIENT)
Dept: URGENT CARE | Age: 20
End: 2025-04-05
Payer: COMMERCIAL

## 2025-04-05 VITALS
HEART RATE: 57 BPM | BODY MASS INDEX: 29.9 KG/M2 | OXYGEN SATURATION: 98 % | DIASTOLIC BLOOD PRESSURE: 88 MMHG | RESPIRATION RATE: 15 BRPM | TEMPERATURE: 97.7 F | SYSTOLIC BLOOD PRESSURE: 130 MMHG | WEIGHT: 226.63 LBS

## 2025-04-05 DIAGNOSIS — S59.902A ELBOW INJURY, LEFT, INITIAL ENCOUNTER: Primary | ICD-10-CM

## 2025-04-05 DIAGNOSIS — S59.902A ELBOW INJURY, LEFT, INITIAL ENCOUNTER: ICD-10-CM

## 2025-04-05 PROCEDURE — 73080 X-RAY EXAM OF ELBOW: CPT | Mod: LEFT SIDE

## 2025-04-05 NOTE — PATIENT INSTRUCTIONS
Follow RICE instructions, take ibuprofen following dose instructions as needed for pain, you can also try over-the-counter topical Biofreeze or Voltaren gel following dosing instructions.  Call referral number to set up and appointment with orthopedics (sports medicine).  If you develop pain in a proportion to what you believe it should be or develop any weakness/numbness or any concerning symptoms please go to the emergency department.

## 2025-04-05 NOTE — PROGRESS NOTES
"Subjective   Patient ID: Napoleon Zhang is a 20 y.o. male. They present today with a chief complaint of Injury (Wrestling yesterday-- hurt elbow feels like there a ball in it -- L elbow/Pain with movement ).    History of Present Illness  20-year-old male presents urgent care for left elbow pain.  States fell on elbow yesterday at wrestling and pain is gradually been worsening since but did not have significant pain at the time of the injury.  States the pain is around a 3-4/10 in intensity.  Denies any radiation of pain.  Denies numbness or weakness or focal deficits.  Pain greatest at the medial epicondyle.  Pain worsened with making a fist and full elbow extension.  Patient is neurovascular intact.  Bilateral radial pulse intact and symmetrical.  Bilateral radial, median, ulnar nerves intact motor and sensory.  Full range of motion of bilateral elbows.  No tenderness of the shoulders or neck/back/mid to proximal forearms or hands or wrists.  Radiologist impression of the left elbow x-ray states \"No acute abnormality seen in the left elbow\".  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  Educated on RICE instructions, ibuprofen/topical therapies, orthopedic referral, ER precautions.  Patient agrees with plan.      Injury      Past Medical History  Allergies as of 04/05/2025 - Reviewed 04/05/2025   Allergen Reaction Noted    Cefdinir GI Upset, Hives, and Nausea/vomiting 04/24/2019    Penicillins Hives 04/23/2010       (Not in a hospital admission)       No past medical history on file.    No past surgical history on file.     reports that he has never smoked. He has never used smokeless tobacco. He reports that he does not drink alcohol and does not use drugs.    Review of Systems  Review of Systems   All other systems reviewed and are negative.                                 Objective    Vitals:    04/05/25 1550   BP: 130/88   BP Location: Left arm   Patient Position: " Sitting   BP Cuff Size: Adult   Pulse: 57   Resp: 15   Temp: 36.5 °C (97.7 °F)   TempSrc: Oral   SpO2: 98%   Weight: 103 kg (226 lb 10.1 oz)     No LMP for male patient.    Physical Exam  Vitals reviewed.   Constitutional:       General: He is not in acute distress.     Appearance: Normal appearance. He is not ill-appearing, toxic-appearing or diaphoretic.   HENT:      Head: Normocephalic and atraumatic.      Nose: Nose normal.   Cardiovascular:      Rate and Rhythm: Normal rate and regular rhythm.   Pulmonary:      Effort: Pulmonary effort is normal. No respiratory distress.   Musculoskeletal:      Cervical back: Normal range of motion and neck supple.      Comments: No overlying skin changes or obvious deformity or crepitus to palpation of the left elbow.  Tenderness greatest around the medial epicondyle to palpation.  Full range of motion bilateral elbows.  Bilateral radial pulses intact and symmetrical.  Bilateral median, ulnar, radial nerves intact motor and sensory.  Equal  strength bilaterally.  No tenderness to the midline C-spine, T-spine, L-spine, shoulders, mid to proximal forearms, wrists or hands.   Skin:     General: Skin is warm and dry.   Neurological:      General: No focal deficit present.      Mental Status: He is alert and oriented to person, place, and time.   Psychiatric:         Mood and Affect: Mood normal.         Behavior: Behavior normal.         Procedures    Point of Care Test & Imaging Results from this visit  No results found for this visit on 04/05/25.   Imaging  No results found.    Cardiology, Vascular, and Other Imaging  No other imaging results found for the past 2 days      Diagnostic study results (if any) were reviewed by Ramírez Hameed PA-C.    Assessment/Plan   Allergies, medications, history, and pertinent labs/EKGs/Imaging reviewed by Ramírez Hameed PA-C.     Medical Decision Making  20-year-old male presents urgent care for left elbow pain.  States fell on  "elbow yesterday at wrestling and pain is gradually been worsening since but did not have significant pain at the time of the injury.  States the pain is around a 3-4/10 in intensity.  Denies any radiation of pain.  Denies numbness or weakness or focal deficits.  Pain greatest at the medial epicondyle.  Pain worsened with making a fist and full elbow extension.  Patient is neurovascular intact.  Bilateral radial pulse intact and symmetrical.  Bilateral radial, median, ulnar nerves intact motor and sensory.  Full range of motion of bilateral elbows.  No tenderness of the shoulders or neck/back/mid to proximal forearms or hands or wrists.  Radiologist impression of the left elbow x-ray states \"No acute abnormality seen in the left elbow\".  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  Educated on RICE instructions, ibuprofen/topical therapies, orthopedic referral, ER precautions.  Patient agrees with plan.    Orders and Diagnoses  There are no diagnoses linked to this encounter.    Medical Admin Record      Patient disposition: Home    Electronically signed by Ramírez Hameed PA-C  3:55 PM      "

## 2025-04-10 PROBLEM — M25.519 SHOULDER PAIN: Status: ACTIVE | Noted: 2025-04-10

## 2025-04-11 ENCOUNTER — OFFICE VISIT (OUTPATIENT)
Dept: PRIMARY CARE | Facility: CLINIC | Age: 20
End: 2025-04-11
Payer: COMMERCIAL

## 2025-04-11 VITALS
SYSTOLIC BLOOD PRESSURE: 118 MMHG | DIASTOLIC BLOOD PRESSURE: 70 MMHG | WEIGHT: 226.2 LBS | HEART RATE: 64 BPM | BODY MASS INDEX: 29.84 KG/M2

## 2025-04-11 DIAGNOSIS — J45.40 ASTHMA, WELL CONTROLLED, MODERATE PERSISTENT (HHS-HCC): ICD-10-CM

## 2025-04-11 DIAGNOSIS — S50.02XA CONTUSION OF LEFT ELBOW, INITIAL ENCOUNTER: Primary | ICD-10-CM

## 2025-04-11 PROCEDURE — 1036F TOBACCO NON-USER: CPT | Performed by: FAMILY MEDICINE

## 2025-04-11 PROCEDURE — 99213 OFFICE O/P EST LOW 20 MIN: CPT | Performed by: FAMILY MEDICINE

## 2025-04-11 RX ORDER — MELOXICAM 7.5 MG/1
7.5-15 TABLET ORAL DAILY
Qty: 30 TABLET | Refills: 1 | Status: SHIPPED | OUTPATIENT
Start: 2025-04-11 | End: 2026-04-11

## 2025-04-11 RX ORDER — ALBUTEROL SULFATE 90 UG/1
1-2 INHALANT RESPIRATORY (INHALATION) EVERY 6 HOURS PRN
Qty: 18 G | Refills: 0 | Status: SHIPPED | OUTPATIENT
Start: 2025-04-11 | End: 2026-10-29

## 2025-04-11 ASSESSMENT — PAIN SCALES - GENERAL: PAINLEVEL_OUTOF10: 0-NO PAIN

## 2025-04-11 NOTE — PROGRESS NOTES
Subjective   Patient ID: Napoleon Zhang is a 20 y.o. male who presents for Left ElbowPain.    HPI   He was wrestling a week ago as part of his [employer] training.  He fell onto his left elbow and it hurt.  He did go for an x-ray which was negative.  However, it still gives him pain during certain movements.  Is gotten better over the last 2 days but he wants me to take a look at it.  Ibuprofen does help.  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    He like a refill of his inhaler though he states he really has not needed it in years.    Review of Systems    Objective   /70   Pulse 64   Wt 103 kg (226 lb 3.2 oz)   BMI 29.84 kg/m²     Physical Exam  He is alert, no apparent distress, his affect is pleasant.  He is tender at the medial epicondyle.  Negative Tinel's test at the ulnar groove.  No redness or swelling.    Assessment/Plan   Diagnoses and all orders for this visit:  Contusion of left elbow, initial encounter  -     meloxicam (Mobic) 7.5 mg tablet; Take 1-2 tablets (7.5-15 mg) by mouth once daily.  Asthma, well controlled, moderate persistent ([facility])  -     albuterol 90 mcg/actuation inhaler; Inhale 1-2 puffs every 6 hours if needed for wheezing.  This is a contusion of the elbow.  Use the meloxicam 1 or 2/day and let me know if this is not resolving over the next week or so.

## 2025-06-09 ENCOUNTER — APPOINTMENT (OUTPATIENT)
Dept: ORTHOPEDIC SURGERY | Facility: CLINIC | Age: 20
End: 2025-06-09
Payer: COMMERCIAL

## 2025-06-11 NOTE — PROGRESS NOTES
"New patient  History Of Present Illness  Napoleon Zhang is a 20 y.o. male presenting with LEFT elbow pain. Pt just finished with the [employer] and now [employer]. Pt states that two and a half months ago while doing [employer], he landed or was hit on his elbow and noticed inflammation afterwards. Notes that it is tender to touch and pain and tenderness with movement. Pain located on the medial and posterior aspect of the elbow. Does not have any issues with weighted movement but notes tender movement with extension and flexion. Denies any numbness or tingling. RIGHT hand dominate. Rates current pain as a 2/10 and up to a 3-4/10 if touched or \"bumped\".  We discussed treatment options.  Upon exam patient has significant tenderness over the medial epicondyle disproportionate to his degree of injury as well as weakness which has not responded over the last 2 and half months which is concerning for a more significant injury.  As such we will order an MRI and patient can follow-up afterward for reevaluation and we can consider further treatment options at that time.  Patient verbalizes understanding and agreement with plan of care.    Past Medical History  He has no past medical history on file.    Surgical History  He has no past surgical history on file.     Social History  He reports that he has never smoked. He has never used smokeless tobacco. He reports that he does not drink alcohol and does not use drugs.    Family History  Family History[1]     Allergies  Cefdinir and Penicillins    Review of Systems  Review of Systems   Constitutional: Negative.    Respiratory: Negative.     Cardiovascular: Negative.    Musculoskeletal:  Positive for arthralgias and myalgias.   All other systems reviewed and are negative.    Last Recorded Vitals  /78 (BP Location: Right arm, Patient Position: Sitting, BP Cuff Size: Adult)   Pulse 64   Ht 1.854 m (6' 1\")   Wt 103 kg (226 lb)   BMI 29.82 kg/m²      The " , YELENA WINTER was present during today's visit and not limited to physical examination!    Examination:  Left Elbow  Erythema: Negative.   Edema: Positive  Effusion: Negative.   Warmth: Negative.   Ecchymosis/Bruising: Negative.   Percussion Test: Positive medial epicondyle   Tuning Fork Test: Positive medial epicondyle   Abrasions: Negative.     Orientation: Asymmetrical due to swelling.            ROM:   Positive FROM but causes pain with flexion and full extension     Muscle Strength: Positive   +3/+5 Triceps (Elbow Extension)          +4/+5 Biceps Brachii (Elbow Flexion)  +4/+5 Brachialis (Elbow Flexion)  +4/+5 Brachioradialis (Elbow Flexion)        +4/+5Coracobrachialis  +3/+5 Common Flexor Tendon Decreased due to pain  +4/+5 Common Extensor Tendon  +4/+5 Pronation  +5/+5 Supination.            DTR/Neurological:   Negative , Normal, Sensation Intact, 2-Point Discrimination: Negative  +2/+4 Biceps Reflex (C5)  +2/+4 Brachioradialis Reflex (C6)  +2/+4 Triceps Reflex (C7).            Sensation/Neurological:    Negative, Sensation Intact; 2-Point Discrimination Test: Negative  C5: Area of collarbones, lateral upper arms, and upper chest  C6: Lateral forearms, thumbs, anterior index finger, and lateral half of the middle finger  C7: Some of posterior index finger, medial half of middle finger, upper posterior back, and back of arms  C8: Ring finger, little finger, medial forearm, and posterior upper back  T1: Medial anterior upper arm, axilla, upper chest, and posterior back.            Palpation: Positive Tenderness to Palpation over the Left Elbow Medial Epicondyle           Vascular:   +2/+4 Carotid Pulse   +2/+4 Brachial Artery   +2/+4 Radial Pulse   +2/+4 Ulnar Pulse,   Capillary Refill< 2 - seconds.            Elbow - Compression Syndrome:  Elbow Flexion Test: Negative.   Tinel Test Ulnar Side: Negative.   Tinel Test Radial Side: Negative.   Pronator Compression Test: Negative.   Supinator  Compression Test: Negative.            Elbow - Instability:  Valgus Stess Test: Negative.   Varus Stress Test: Negative.   Posterolateral Rotary Instability Test: Negative.   Ulnar Nerve Instability Test: Positive   Milking Maneuver Test: Negative.   Lateral Pivot Shift Test: Negative.         Elbow - Lateral Epicondylitis:  Tennis Elbow Test: Negative.   Cozen Test: Negative.   Chair Test: Negative.   Higuera Test: Negative.   Mill Test: Negative.   Forearm/Wrist Extension Test: Negative.   Maudsley's/Schulburg's (Middle Finger Extension) Test: Negative.         Elbow - Medial Epicondylitis:  Golfer's Elbow Sign: Positive   Reverse Cozen Test: Positive   Reverse Chair Test: Positive  Wrist Flexion Test: Positive   4th and 5th Finger Flexion Test: Positive    Elbow - Orientation:  Hyperflexion Test: Negative.   Hyperextension Test: Negative.   Supination Stress Test: Negative.   Pronation Stress Test:  Negative.          Imaging and Diagnostics Review:  Previously ordered radiographs independently reviewed.  No acute fractures or dislocations noted however patient does have a slight effusion.  Assessment   1. Left elbow pain    2. Elbow sprain, left, initial encounter    3. Contusion of left elbow, initial encounter    4. Tendinitis of flexor tendon of left hand    5. Elbow sprain, ulnar collateral ligament, left, initial encounter        Plan   Treatment or Intervention:  May use RICE therapy as needed  Start into hand/physical therapy 1-2 times a week for 8-10 weeks with manual therapy as well as dry needling and IASTM  Additionally reviewed modifying activities to be performed while exercising as well as activities with daily living  Discussed in detail with the patient to the level of their understanding the possibility in the future of regenerative injections versus corticosteroid injections  Recommendation over-the-counter calcium 500mg, 3 times a day with vitamin-D3 7922-1194+ units a day with food as well  as a daily multivitamin  Recommendation over-the-counter Move Free for joint health  May take OTC Tylenol Extra Strength or OTC Tylenol Arthritis, taking one every 6-8 hours with food as needed for pain management.  Patient advised regarding the risks and/or potential adverse reactions and/or side effects of any prescribed medications along with any over-the-counter medications or any supplements used. Patient advised to seek immediate medical care if any adverse reactions occur. The patient and/or patient(s) parent(s) verbalized their understanding  MRI of LEFT ELBOW to rule out tendon vs ligament vs tear vs fracture vs other, MSK to read.     Diagnostic studies:  XR elbow left 3+ views  Narrative: Interpreted By:  Jerson Mittal,   STUDY: XR ELBOW LEFT 3+ VIEWS; ;  4/5/2025 4:12 pm      INDICATION: Signs/Symptoms:Fell on elbow yesterday at wrestling.  Tenderness greatest around the medial epicondyle.      COMPARISON: None.      ACCESSION NUMBER(S):  [id number]      ORDERING CLINICIAN:  MIGUELITO YUNG      FINDINGS:  Left elbow, five views      There is no fracture. There is no dislocation. There are no  degenerative changes. There is no lytic or sclerotic lesion. There is  no soft tissue abnormality seen. There is no effusion      Impression: No acute abnormality seen in the left elbow      Signed by: Jerson Mittal 4/5/2025 4:14 PM  Dictation workstation:   [id number]     Activity Instructions, Restrictions, and Accommodations:      Consultations/Referrals:  Occupational therapy    Follow-up:  Follow up after MRI    Davon Salmeron CNP           [1] No family history on file.

## 2025-06-12 ENCOUNTER — APPOINTMENT (OUTPATIENT)
Dept: ORTHOPEDIC SURGERY | Facility: CLINIC | Age: 20
End: 2025-06-12
Payer: COMMERCIAL

## 2025-06-12 VITALS
WEIGHT: 226 LBS | HEART RATE: 64 BPM | SYSTOLIC BLOOD PRESSURE: 120 MMHG | HEIGHT: 73 IN | BODY MASS INDEX: 29.95 KG/M2 | DIASTOLIC BLOOD PRESSURE: 78 MMHG

## 2025-06-12 DIAGNOSIS — S53.402A ELBOW SPRAIN, LEFT, INITIAL ENCOUNTER: ICD-10-CM

## 2025-06-12 DIAGNOSIS — S53.442A ELBOW SPRAIN, ULNAR COLLATERAL LIGAMENT, LEFT, INITIAL ENCOUNTER: ICD-10-CM

## 2025-06-12 DIAGNOSIS — M25.522 LEFT ELBOW PAIN: ICD-10-CM

## 2025-06-12 DIAGNOSIS — M77.8 TENDINITIS OF FLEXOR TENDON OF LEFT HAND: ICD-10-CM

## 2025-06-12 DIAGNOSIS — S50.02XA CONTUSION OF LEFT ELBOW, INITIAL ENCOUNTER: ICD-10-CM

## 2025-06-12 PROCEDURE — 99205 OFFICE O/P NEW HI 60 MIN: CPT | Performed by: NURSE PRACTITIONER

## 2025-06-12 PROCEDURE — 99204 OFFICE O/P NEW MOD 45 MIN: CPT | Performed by: NURSE PRACTITIONER

## 2025-06-12 PROCEDURE — 3008F BODY MASS INDEX DOCD: CPT | Performed by: NURSE PRACTITIONER

## 2025-06-12 ASSESSMENT — ENCOUNTER SYMPTOMS
MYALGIAS: 1
OCCASIONAL FEELINGS OF UNSTEADINESS: 0
CONSTITUTIONAL NEGATIVE: 1
RESPIRATORY NEGATIVE: 1
CARDIOVASCULAR NEGATIVE: 1
LOSS OF SENSATION IN FEET: 0
DEPRESSION: 0
ARTHRALGIAS: 1

## 2025-06-12 ASSESSMENT — PATIENT HEALTH QUESTIONNAIRE - PHQ9
SUM OF ALL RESPONSES TO PHQ9 QUESTIONS 1 AND 2: 0
2. FEELING DOWN, DEPRESSED OR HOPELESS: NOT AT ALL
1. LITTLE INTEREST OR PLEASURE IN DOING THINGS: NOT AT ALL

## 2025-06-12 ASSESSMENT — PAIN SCALES - GENERAL: PAINLEVEL_OUTOF10: 2

## 2025-06-12 NOTE — PATIENT INSTRUCTIONS
May use RICE therapy as needed  Start into hand/physical therapy 1-2 times a week for 8-10 weeks with manual therapy as well as dry needling and IASTM  Additionally reviewed modifying activities to be performed while exercising as well as activities with daily living  Discussed in detail with the patient to the level of their understanding the possibility in the future of regenerative injections versus corticosteroid injections  Recommendation over-the-counter calcium 500mg, 3 times a day with vitamin-D3 8648-9071+ units a day with food as well as a daily multivitamin  Recommendation over-the-counter Move Free for joint health  May take OTC Tylenol Extra Strength or OTC Tylenol Arthritis, taking one every 6-8 hours with food as needed for pain management.  Patient advised regarding the risks and/or potential adverse reactions and/or side effects of any prescribed medications along with any over-the-counter medications or any supplements used. Patient advised to seek immediate medical care if any adverse reactions occur. The patient and/or patient(s) parent(s) verbalized their understanding  MRI of LEFT ELBOW to rule out tendon vs ligament vs tear vs fracture vs other, MSK to read.   Follow up after MRI    You have been ordered an MRI of the LEFT ELBOW. Once you contact scheduling at [phone] and obtain the date and time of your MRI, contact our office at [phone] to schedule your follow-up appointment to review your results.

## 2025-07-07 ENCOUNTER — APPOINTMENT (OUTPATIENT)
Dept: RADIOLOGY | Facility: HOSPITAL | Age: 20
End: 2025-07-07
Payer: COMMERCIAL

## 2025-07-16 ENCOUNTER — APPOINTMENT (OUTPATIENT)
Dept: RADIOLOGY | Facility: HOSPITAL | Age: 20
End: 2025-07-16
Payer: COMMERCIAL

## 2025-07-27 ENCOUNTER — APPOINTMENT (OUTPATIENT)
Dept: RADIOLOGY | Facility: HOSPITAL | Age: 20
End: 2025-07-27
Payer: COMMERCIAL